# Patient Record
Sex: FEMALE | Race: WHITE | NOT HISPANIC OR LATINO | Employment: FULL TIME | URBAN - METROPOLITAN AREA
[De-identification: names, ages, dates, MRNs, and addresses within clinical notes are randomized per-mention and may not be internally consistent; named-entity substitution may affect disease eponyms.]

---

## 2018-04-10 ENCOUNTER — TRANSFERRED RECORDS (OUTPATIENT)
Dept: HEALTH INFORMATION MANAGEMENT | Facility: CLINIC | Age: 28
End: 2018-04-10

## 2018-04-17 ENCOUNTER — MEDICAL CORRESPONDENCE (OUTPATIENT)
Dept: HEALTH INFORMATION MANAGEMENT | Facility: CLINIC | Age: 28
End: 2018-04-17

## 2018-04-26 ENCOUNTER — OFFICE VISIT (OUTPATIENT)
Dept: SURGERY | Facility: CLINIC | Age: 28
End: 2018-04-26
Payer: COMMERCIAL

## 2018-04-26 VITALS
DIASTOLIC BLOOD PRESSURE: 91 MMHG | WEIGHT: 128 LBS | SYSTOLIC BLOOD PRESSURE: 136 MMHG | HEIGHT: 62 IN | HEART RATE: 93 BPM | BODY MASS INDEX: 23.55 KG/M2

## 2018-04-26 DIAGNOSIS — R10.31 RIGHT GROIN PAIN: Primary | ICD-10-CM

## 2018-04-26 PROCEDURE — 99244 OFF/OP CNSLTJ NEW/EST MOD 40: CPT | Performed by: SURGERY

## 2018-04-26 RX ORDER — MULTIPLE VITAMINS W/ MINERALS TAB 9MG-400MCG
1 TAB ORAL DAILY
COMMUNITY
End: 2024-03-05

## 2018-04-26 NOTE — LETTER
"[facility]  Surgery Consultation     2018    Re: Noemi Farias, : 1990    CONSULTATION REQUESTED BY:  RAAD Phillips     HPI: P this patient is a 27-year-old female referred by by the above-mentioned provider for consultation regarding possible right-sided sports hernia.  She reports that in January of this past year she was participating in a  conditioning course.  During the performance of this course she was participating in a 6 mile pack hike.  She experienced sharp pain within the area of her right hip as well as in the right lower quadrant and abdomen.  She experienced weakness associated with this and had to discontinue the activity.  Since then she has had ongoing discomfort ever since.  She has been treated with physical therapy as well as other modes of treatment.  She reports that the area aches all the time and gets tight with walking.  She feels that his perhaps gotten slightly worse.  She also describes shooting pains down her inner thigh with a long stride.  She has no pain on coughing or sneezing.  She had an MRI which revealed a right-sided labral tear.     PMH:   has no past medical history on file.  PSH:    has no past surgical history on file.  Social History:   reports that she has never smoked. She has never used smokeless tobacco.  Family History:  family history includes CEREBROVASCULAR DISEASE in her paternal grandmother; Colon Cancer in her maternal grandfather; Coronary Artery Disease in her paternal grandmother; DIABETES in her paternal grandmother; Hypertension in her father and paternal grandmother.  Medications/Allergies: Home medications and allergies reviewed.     ROS:  The 10 point Review of Systems is negative other than noted in the HPI.     Physical Exam:  BP (!) 136/91  Pulse 93  Ht 5' 2\" (1.575 m)  Wt 128 lb (58.1 kg)  BMI 23.41 kg/m2  GENERAL: Generally appears well.  Psych: Alert and Oriented.  Normal affect  Eyes: Sclera " clear  Respiratory:  Lungs clear to ausculation bilaterally with good air excursion  Cardiovascular:  Regular Rate and Rhythm with no murmurs gallops or rubs, normal peripheral pulses  GI: Abdomen Non Distended Non-Tender  No hernias palpated..  Groin- I examined the patient in both the standing and supine positions. Right Groin- No hernia Palpated.  Tenderness palpated in groin.  Tenderness within the right inguinal area was relatively minimal.  This was slightly worse with leg raise and sit up.  She had greater discomfort in the area of the medial groin/adductor tendon.  Left Groin- No hernia Palpated.  No tenderness on palpation. Lymphatic/Hematologic/Immune:  No femoral or cervical lymphadenopathy.  Integumentary:  No rashes  Neurological: grossly intact      All new lab and imaging data was reviewed.      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  Some minimal symptoms to suggest sports hernia.       PLAN: Given the lack of insurance coverage for sports hernia surgery would be my suggestion that she consider tendon lengthening surgery as a first line of treatment.  Thereafter I think she needs to have consideration regarding intervention on the hip itself.  I discussed the pathophysiology of sports hernias and options for repair including laparoscopic VS open.  Her questions were all answered.  She is to meet with her local orthopedist next week and hopefully make a decision regarding which direction to proceed.    Thank you very much for this consult.     Viraj Cervantes M.D.  [facility]  [phone]

## 2018-04-26 NOTE — MR AVS SNAPSHOT
"              After Visit Summary   2018    Noemi Farias    MRN: [id number]           Patient Information     Date Of Birth          1990        Visit Information        Provider Department      2018 11:45 AM Viraj Cervantes [facility] General [facility]       Follow-ups after your visit        Who to contact     If you have questions or need follow up information about today's clinic visit or your schedule please contact [facility] directly at [phone].  Normal or non-critical lab and imaging results will be communicated to you by Seeqhart, letter or phone within 4 business days after the clinic has received the results. If you do not hear from us within 7 days, please contact the clinic through Seeqhart or phone. If you have a critical or abnormal lab result, we will notify you by phone as soon as possible.  Submit refill requests through Model Metrics or call your pharmacy and they will forward the refill request to us. Please allow 3 business days for your refill to be completed.          Additional Information About Your Visit        MyChart Information     Model Metrics lets you send messages to your doctor, view your test results, renew your prescriptions, schedule appointments and more. To sign up, go to [email or web address] . Click on \"Log in\" on the left side of the screen, which will take you to the Welcome page. Then click on \"Sign up Now\" on the right side of the page.     You will be asked to enter the access code listed below, as well as some personal information. Please follow the directions to create your username and password.     Your access code is: [email or web address]  Expires: 2018 12:09 PM     Your access code will  in 90 days. If you need help or a new code, please call your [place] clinic or [phone].        Care EveryWhere ID     This is your Care EveryWhere ID. This could be used by other " "organizations to access your [place] medical records  [id number]        Your Vitals Were     Pulse Height BMI (Body Mass Index)             93 5' 2\" (1.575 m) 23.41 kg/m2          Blood Pressure from Last 3 Encounters:   04/26/18 (!) 136/91    Weight from Last 3 Encounters:   04/26/18 128 lb (58.1 kg)              Today, you had the following     No orders found for display       Primary Care Provider Fax #    Physician No Ref-Primary [phone]       No address on file        Equal Access to Services     VIDA DUMONT : Hadii aad ku hadasho Soomaali, waaxda luqadaha, qaybta kaalmada adeegyada, yina ryderin haygonzalon sree randolph . So [facility] [phone].    ATENCIÓN: Si habla español, tiene a carrington disposición servicios gratuitos de asistencia lingüística. Llame al [phone].    We comply with applicable federal civil rights laws and [place] laws. We do not discriminate on the basis of race, color, national origin, age, disability, sex, sexual orientation, or gender identity.            Thank you!     Thank you for choosing [facility]  for your care. Our goal is always to provide you with excellent care. Hearing back from our patients is one way we can continue to improve our services. Please take a few minutes to complete the written survey that you may receive in the mail after your visit with us. Thank you!             Your Updated Medication List - Protect others around you: Learn how to safely use, store and throw away your medicines at www.disposemymeds.org.          This list is accurate as of 4/26/18 12:09 PM.  Always use your most recent med list.                   Brand Name Dispense Instructions for use Diagnosis    IBUPROFEN PO           Multi-vitamin Tabs tablet      Take 1 tablet by mouth daily        TART CHERRY ADVANCED PO           TYLENOL 325 MG Caps   Generic drug:  Acetaminophen           VITAMIN D (CHOLECALCIFEROL) PO      Take by mouth daily          "

## 2018-04-26 NOTE — PROGRESS NOTES
"[facility]  Surgery Consultation    CONSULTATION REQUESTED BY:  RAAD Phillips    HPI: P this patient is a 27-year-old female referred by by the above-mentioned provider for consultation regarding possible right-sided sports hernia.  She reports that in January of this past year she was participating in a  conditioning course.  During the performance of this course she was participating in a 6 mile pack hike.  She experienced sharp pain within the area of her right hip as well as in the right lower quadrant and abdomen.  She experienced weakness associated with this and had to discontinue the activity.  Since then she has had ongoing discomfort ever since.  She has been treated with physical therapy as well as other modes of treatment.  She reports that the area aches all the time and gets tight with walking.  She feels that his perhaps gotten slightly worse.  She also describes shooting pains down her inner thigh with a long stride.  She has no pain on coughing or sneezing.  She had an MRI which revealed a right-sided labral tear.    PMH:   has no past medical history on file.  PSH:    has no past surgical history on file.  Social History:   reports that she has never smoked. She has never used smokeless tobacco.  Family History:  family history includes CEREBROVASCULAR DISEASE in her paternal grandmother; Colon Cancer in her maternal grandfather; Coronary Artery Disease in her paternal grandmother; DIABETES in her paternal grandmother; Hypertension in her father and paternal grandmother.  Medications/Allergies: Home medications and allergies reviewed.    ROS:  The 10 point Review of Systems is negative other than noted in the HPI.    Physical Exam:  BP (!) 136/91  Pulse 93  Ht 5' 2\" (1.575 m)  Wt 128 lb (58.1 kg)  BMI 23.41 kg/m2  GENERAL: Generally appears well.  Psych: Alert and Oriented.  Normal affect  Eyes: Sclera clear  Respiratory:  Lungs clear to ausculation bilaterally with " good air excursion  Cardiovascular:  Regular Rate and Rhythm with no murmurs gallops or rubs, normal peripheral pulses  GI: Abdomen Non Distended Non-Tender  No hernias palpated..  Groin- I examined the patient in both the standing and supine positions. Right Groin- No hernia Palpated.  Tenderness palpated in groin.  Tenderness within the right inguinal area was relatively minimal.  This was slightly worse with leg raise and sit up.  She had greater discomfort in the area of the medial groin/adductor tendon.  Left Groin- No hernia Palpated.  No tenderness on palpation. Lymphatic/Hematologic/Immune:  No femoral or cervical lymphadenopathy.  Integumentary:  No rashes  Neurological: grossly intact     All new lab and imaging data was reviewed.     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  Some minimal symptoms to suggest sports hernia.      PLAN: Given the lack of insurance coverage for sports hernia surgery would be my suggestion that she consider tendon lengthening surgery as a first line of treatment.  Thereafter I think she needs to have consideration regarding intervention on the hip itself.  I discussed the pathophysiology of sports hernias and options for repair including laparoscopic VS open.  Her questions were all answered.  She is to meet with her local orthopedist next week and hopefully make a decision regarding which direction to proceed.      Thank you very much for this consult.    Viraj Cervantes M.D.  [facility]  [phone]    Please route or send letter to:  Primary Care Provider (PCP) and Referring Provider  Cc: Vitor Christianson, [facility] and Rosendo Ibrahim MD

## 2023-10-06 ENCOUNTER — TELEPHONE (OUTPATIENT)
Dept: PLASTIC SURGERY | Facility: CLINIC | Age: 33
End: 2023-10-06
Payer: COMMERCIAL

## 2023-10-06 DIAGNOSIS — F64.0 GENDER DYSPHORIA IN ADULT: Primary | ICD-10-CM

## 2023-10-06 NOTE — TELEPHONE ENCOUNTER
[facility] Call Center    Phone Message    May a detailed message be left on voicemail: yes     Reason for Call: Other: Patient called to schedule a consult for Top Surgery.  F to M and preferred pronouns are They/Them.  Please follow up with patient.     Action Taken: Message routed to:  [facility]    Travel Screening: Not Applicable

## 2023-10-06 NOTE — CONFIDENTIAL NOTE
[facility] :  Care Coordination Note     SITUATION   MEGAN Farias (they/them) is a 32 year old adult who is receiving support for:  Appointment (Top Surgery consult) and Care Team  .    BACKGROUND     Pt is scheduled for a gender affirming mastectomy consult with Dr. Pa on 3/7/24.     ASSESSMENT     Surgery              [facility] Assessment  [facility] Gender Care ([facility] Enrollment: (P) Enrolled  Patient has a therapist: (P) Yes  Letter of support #1: (P) Requested  Surgery being considered: (P) Yes  Mastectomy: (P) Yes    Pt reports:   No nicotine or other gender affirming surgeries    PLAN          Nursing Interventions:       Follow-up plan:  1. Obtain LOS  2. Pt reports their insurance will require referral from PCP. Writer encouraged pt to have provider send this prior to consult.        Ely Hanna

## 2023-10-23 ENCOUNTER — TRANSCRIBE ORDERS (OUTPATIENT)
Dept: OTHER | Age: 33
End: 2023-10-23

## 2023-10-23 DIAGNOSIS — F64.9 GENDER DYSPHORIA: Primary | ICD-10-CM

## 2024-01-05 ENCOUNTER — TELEPHONE (OUTPATIENT)
Dept: PLASTIC SURGERY | Facility: CLINIC | Age: 34
End: 2024-01-05
Payer: COMMERCIAL

## 2024-01-05 NOTE — TELEPHONE ENCOUNTER
[facility] Call Center    Phone Message    May a detailed message be left on voicemail: yes     Reason for Call: Other: Patient called as they received a message to call back and reschedule the 3/7/24 appt with Dr. Pa ([facility]).  Please follow up with patient.     Action Taken: Message routed to:  [facility]    Travel Screening: Not Applicable

## 2024-01-05 NOTE — TELEPHONE ENCOUNTER
Return call placed, patient has been rescheduled.     Krystina Rodriguez RN on 1/5/2024 at 3:18 PM

## 2024-02-25 ENCOUNTER — HEALTH MAINTENANCE LETTER (OUTPATIENT)
Age: 34
End: 2024-02-25

## 2024-03-05 ENCOUNTER — OFFICE VISIT (OUTPATIENT)
Dept: PLASTIC SURGERY | Facility: AMBULATORY SURGERY CENTER | Age: 34
End: 2024-03-05
Attending: PLASTIC SURGERY
Payer: COMMERCIAL

## 2024-03-05 VITALS
BODY MASS INDEX: 27.6 KG/M2 | HEIGHT: 62 IN | WEIGHT: 150 LBS | DIASTOLIC BLOOD PRESSURE: 78 MMHG | SYSTOLIC BLOOD PRESSURE: 124 MMHG

## 2024-03-05 DIAGNOSIS — F64.0 GENDER DYSPHORIA IN ADULT: Primary | ICD-10-CM

## 2024-03-05 PROCEDURE — 99203 OFFICE O/P NEW LOW 30 MIN: CPT | Performed by: PLASTIC SURGERY

## 2024-03-05 RX ORDER — TRETINOIN 1 MG/G
CREAM TOPICAL AT BEDTIME
COMMUNITY
Start: 2023-03-01

## 2024-03-05 RX ORDER — TESTOSTERONE CYPIONATE 200 MG/ML
INJECTION, SOLUTION INTRAMUSCULAR WEEKLY
COMMUNITY
Start: 2023-06-07

## 2024-03-05 RX ORDER — HYDROXYZINE PAMOATE 50 MG/1
50 CAPSULE ORAL 3 TIMES DAILY PRN
COMMUNITY
Start: 2024-02-01

## 2024-03-05 RX ORDER — TRAZODONE HYDROCHLORIDE 100 MG/1
100 TABLET ORAL AT BEDTIME
COMMUNITY
Start: 2022-09-01

## 2024-03-05 RX ORDER — DULOXETIN HYDROCHLORIDE 60 MG/1
60 CAPSULE, DELAYED RELEASE ORAL DAILY
COMMUNITY
Start: 2022-09-01

## 2024-03-05 NOTE — PROGRESS NOTES
March 5, 2024    Chief complaint:  Gender dysphoria, consultation for top surgery     History of present illness:  This is a 33 year old patient who comes today for consultation regarding top surgery.  Pronouns they/them.  Patient's name is Jorge.  Patient has been taking testosterone for the last 1 year and a half.  They have been dealing with gender dysphoria for the last 20 years.  Letter of support is ready.     Past medical history:  History reviewed. No pertinent past medical history.    Gender dysphoria     Past surgical history:  Right hip arthroplasty, right knee arthroscopy     Allergies:  No known drug allergies    Medications:    Current Outpatient Medications:     Acetaminophen (TYLENOL) 325 MG CAPS, , Disp: , Rfl:     DULoxetine (CYMBALTA) 60 MG capsule, Take 60 mg by mouth daily, Disp: , Rfl:     hydrOXYzine (VISTARIL) 50 MG capsule, Take 50 mg by mouth 3 times daily as needed for anxiety, Disp: , Rfl:     IBUPROFEN PO, , Disp: , Rfl:     testosterone cypionate (DEPOTESTOSTERONE) 200 MG/ML injection, Inject into the muscle once a week, Disp: , Rfl:     traZODone (DESYREL) 100 MG tablet, Take 100 mg by mouth at bedtime, Disp: , Rfl:     tretinoin (RETIN-A) 0.1 % external cream, Apply topically at bedtime, Disp: , Rfl:     Family history:  Father with renal cell carcinoma.     Social History:  Denies tobacco, drinks alcohol socially.     Review of systems:  General ROS: No complaints or constitutional symptoms  Skin: No complaints or symptoms   Hematologic/Lymphatic: No symptoms or complaints  Psychiatric: Patient presents with gender dysphoria  Endocrine: No excessive fatigue, no hypermetabolic symptoms reported  Respiratory ROS: No cough, shortness of breath, or wheezing  Cardiovascular ROS: No chest pain or dyspnea on exertion  Breast ROS: Denies nipple discharge, denies palpable breast masses, denies peau d'orange.  Gastrointestinal ROS: No abdominal pain, nausea, diarrhea, or  "constipation  Musculoskeletal ROS: No recent injuries reported  Neurological ROS: No focal neurologic defects reported.       Physical exam:    /78   Ht 1.575 m (5' 2\")   Wt 68 kg (150 lb)   BMI 27.44 kg/m    General: Alert, cooperative, appears stated age   Skin: Skin color, texture, turgor normal, no rashes or lesions   Lymphatic: No obvious adenopathy, no swelling   Eyes: No scleral icterus, pupils equal  HENT: No traumatic injury to the head or face, no gross abnormalities  Lungs: Normal respiratory effort, breath sounds equal bilaterally  Heart: Regular rate and rhythm  Breasts: Bilateral grade 2 ptosis, no obvious nipple inversion, no obvious nipple discharge, no obvious palpable breast masses, no peau d'orange.  No palpable axillary lymphadenopathies bilaterally.  Abdomen: Soft, non-distended and non-tender to palpation  Neurologic: Grossly intact           Assessment:     33 year old  patient with history of gender dysphoria.     PLAN:      I believe that Jorge is a good candidate for gender affirming top surgery with one of the following options: Bilateral simple mastectomies versus bilateral breast reduction with possible nipple graft reconstruction.  Patient desires POSSIBLE nipple reconstruction with nipple grafting bilaterally.     \"According to [place] case law and [facility] standards of care, with an appropriate letter of support from a mental health provider, top surgery/mastectomy is medically necessary for the treatment of gender dysphoria.\"     I discussed with Jorge the risks of surgery and they include but are not limited to scarring, keloid formation, infection, bleeding, hematoma, seroma, contour deformities, lack of sensation on the chest, loss of nipple areolar graft requiring future tattooing, hypopigmentation of the nipple grafting with potential need for future tattooing.     Patient did acknowledge all of these risks and has agreed to proceed.     We will schedule for surgery: " bilateral simple mastectomies with POSSIBLE bilateral nipple grafting.     Time spent with the patient 30 minutes.      Sonny Pa MD , FACS   Diplomate American Board of Plastic Surgery  Diplomate American Board of Surgery  Adj. Assistant Professor of Surgery  Division of Plastic & Reconstructive Surgery   [facility]  Office: [phone]   3/5/2024 at 9:17 AM

## 2024-03-05 NOTE — LETTER
3/5/2024         RE: Noemi Farias  [address] [address]        Dear Colleague,    Thank you for referring your patient, Noemi Farias, to the [facility] [place]. Please see a copy of my visit note below.    March 5, 2024    Chief complaint:  Gender dysphoria, consultation for top surgery     History of present illness:  This is a 33 year old patient who comes today for consultation regarding top surgery.  Pronouns they/them.  Patient's name is Jorge.  Patient has been taking testosterone for the last 1 year and a half.  They have been dealing with gender dysphoria for the last 20 years.  Letter of support is ready.     Past medical history:  History reviewed. No pertinent past medical history.    Gender dysphoria     Past surgical history:  Right hip arthroplasty, right knee arthroscopy     Allergies:  No known drug allergies    Medications:    Current Outpatient Medications:      Acetaminophen (TYLENOL) 325 MG CAPS, , Disp: , Rfl:      DULoxetine (CYMBALTA) 60 MG capsule, Take 60 mg by mouth daily, Disp: , Rfl:      hydrOXYzine (VISTARIL) 50 MG capsule, Take 50 mg by mouth 3 times daily as needed for anxiety, Disp: , Rfl:      IBUPROFEN PO, , Disp: , Rfl:      testosterone cypionate (DEPOTESTOSTERONE) 200 MG/ML injection, Inject into the muscle once a week, Disp: , Rfl:      traZODone (DESYREL) 100 MG tablet, Take 100 mg by mouth at bedtime, Disp: , Rfl:      tretinoin (RETIN-A) 0.1 % external cream, Apply topically at bedtime, Disp: , Rfl:     Family history:  Father with renal cell carcinoma.     Social History:  Denies tobacco, drinks alcohol socially.     Review of systems:  General ROS: No complaints or constitutional symptoms  Skin: No complaints or symptoms   Hematologic/Lymphatic: No symptoms or complaints  Psychiatric: Patient presents with gender dysphoria  Endocrine: No excessive fatigue, no hypermetabolic symptoms reported  Respiratory ROS: No cough,  "shortness of breath, or wheezing  Cardiovascular ROS: No chest pain or dyspnea on exertion  Breast ROS: Denies nipple discharge, denies palpable breast masses, denies peau d'orange.  Gastrointestinal ROS: No abdominal pain, nausea, diarrhea, or constipation  Musculoskeletal ROS: No recent injuries reported  Neurological ROS: No focal neurologic defects reported.       Physical exam:    /78   Ht 1.575 m (5' 2\")   Wt 68 kg (150 lb)   BMI 27.44 kg/m    General: Alert, cooperative, appears stated age   Skin: Skin color, texture, turgor normal, no rashes or lesions   Lymphatic: No obvious adenopathy, no swelling   Eyes: No scleral icterus, pupils equal  HENT: No traumatic injury to the head or face, no gross abnormalities  Lungs: Normal respiratory effort, breath sounds equal bilaterally  Heart: Regular rate and rhythm  Breasts: Bilateral grade 2 ptosis, no obvious nipple inversion, no obvious nipple discharge, no obvious palpable breast masses, no peau d'orange.  No palpable axillary lymphadenopathies bilaterally.  Abdomen: Soft, non-distended and non-tender to palpation  Neurologic: Grossly intact           Assessment:     33 year old  patient with history of gender dysphoria.     PLAN:      I believe that Jorge is a good candidate for gender affirming top surgery with one of the following options: Bilateral simple mastectomies versus bilateral breast reduction with possible nipple graft reconstruction.  Patient desires POSSIBLE nipple reconstruction with nipple grafting bilaterally.     \"According to [place] case law and [facility] standards of care, with an appropriate letter of support from a mental health provider, top surgery/mastectomy is medically necessary for the treatment of gender dysphoria.\"     I discussed with Jorge the risks of surgery and they include but are not limited to scarring, keloid formation, infection, bleeding, hematoma, seroma, contour deformities, lack of sensation on the chest, loss of " nipple areolar graft requiring future tattooing, hypopigmentation of the nipple grafting with potential need for future tattooing.     Patient did acknowledge all of these risks and has agreed to proceed.     We will schedule for surgery: bilateral simple mastectomies with POSSIBLE bilateral nipple grafting.     Time spent with the patient 30 minutes.      Sonny Pa MD , FACS   Diplomate American Board of Plastic Surgery  Diplomate American Board of Surgery  Adj. Assistant Professor of Surgery  Division of Plastic & Reconstructive Surgery   [facility]  Office: [phone]   3/5/2024 at 9:17 AM       Again, thank you for allowing me to participate in the care of your patient.        Sincerely,        Sonny Pa MD

## 2024-03-20 ENCOUNTER — DOCUMENTATION ONLY (OUTPATIENT)
Dept: PLASTIC SURGERY | Facility: CLINIC | Age: 34
End: 2024-03-20
Payer: COMMERCIAL

## 2024-03-20 NOTE — PROGRESS NOTES
[facility] [place] [facility] :  Care Coordination Note     SITUATION   Charan Farias (they/them) is a 33 year old adult who is receiving support for:  Care Team  .    BACKGROUND     [place] needs [employer] language. Sent message to pt with needed update.    3/27/2024  Updated LOS meets [employer] criteria. Sent message to RNCC and PA with update.    ASSESSMENT     Surgery              [facility] Assessment  Comprehensive Gender Care ([facility]) Enrollment: Enrolled  Patient has a therapist: Yes  Letter of support #1: Received  Letter #1 Date: 02/28/24  Surgery being considered: Yes  Mastectomy: Yes      PLAN          Nursing Interventions:       Follow-up plan:  Pt will upload updated LOS.        TERRY Kwong

## 2024-03-21 ENCOUNTER — TELEPHONE (OUTPATIENT)
Dept: PLASTIC SURGERY | Facility: CLINIC | Age: 34
End: 2024-03-21
Payer: COMMERCIAL

## 2024-04-03 ENCOUNTER — TELEPHONE (OUTPATIENT)
Dept: PLASTIC SURGERY | Facility: AMBULATORY SURGERY CENTER | Age: 34
End: 2024-04-03
Payer: COMMERCIAL

## 2024-04-03 PROBLEM — F64.0 GENDER DYSPHORIA IN ADULT: Status: ACTIVE | Noted: 2024-03-05

## 2024-04-03 NOTE — LETTER
Pre-op Physical:  Schedule a pre-op physical with your primary care doctor if not internal to [facility].  If internal, we have scheduled this.   The pre-op physical must be 10-30 days before surgery and since it is required by anesthesia, your surgery will be cancelled if it's not done.      Surgery Date: 5/15/2024     Location: [facility].    [address], [address], [place]  [address]    Approximate Arrival Time: 5:45 am  (Unless instructed differently by the pre-op call nurse)     Post op Appointment: 5/22/2024 at 10:00 am with  Krystina  [facility],   [address], [place] [address].    Post op Appointment: 6/17/2024 at 10:30 am with  Tiffany Murray NP  [facility].    [address], [address], [place]  [address]    Pre-Surgical Tasks:     Review all medications with your primary care or prescribing physician; they will advise you which meds to stop and when, and when you can resume taking.  Certain medications like blood thinners and weight loss medications need to be stopped in advance of surgery to proceed safely.      Blood thinners including but not exclusive to drugs like Xarelto, Eliquis, Warfarin and Aspirin, should be stopped five days before surgery, if your prescribing provider agrees. Follow your provider's advice on stopping blood thinners because they know you best.  If you are unsure if your medication is a blood thinner, ask your prescribing provider.    Weight loss medications: There are multiple medications being used for weight management and diabetes today, and the list is growing.  Phentermine, Ozempic, Wegovy, Trulicity, and other similar medications need to be stopped one week before surgery to avoid being cancelled.  Victoza and Saxenda can be continued longer but must be stopped one full day before surgery.   Please ask your prescribing provider for advice.    Diabetic medications: in addition to the medications talked about above that are used for either weight loss or diabetes, some people are on insulin that may require adjustment.  Please discuss managing diabetic medications with your prescribing doctor as these medications may require modification prior to surgery.     Please shower the evening before and morning of surgery with Hibiclens soap.  Any [facility] can provide this to you at no cost, or it can be found at your local pharmacy.     Fasting instructions will be provided by the pre-op nurse who will call you 1-3 days before surgery.  Typically, we advise normal food up to 8 hours before you arrive for surgery. Clear liquids only from then until 2 hours before you arrive surgery, then nothing at all by mouth.  The nurse will review your specific instructions with you at the call.      Smoking impacts your body's ability to heal properly so we advise patients to quit if possible before surgery.  Plastic Surgery patients are required to be nicotine free for at least 8 weeks before surgery.      You will need an adult to drive you home and stay with you 24 hours after surgery. Public transportation or Medical Van Services are not permitted.    Visitor restrictions are subject to change, please verify with the pre-op nurse when they call how many people are permitted to accompany you.    We always encourage you to notify your insurance any time you have medical tests or procedures scheduled including surgery. The number is usually right on the back of your insurance card. To obtain pricing for surgery, please call [facility] Cost of Care at [phone] or email [email or web address].        Call our office if you have any questions! Thank you!         Bhumi Alcantara MA  [facility]   (General Surgery/ ENT/ Plastics)  Direct Office: [phone]

## 2024-04-03 NOTE — TELEPHONE ENCOUNTER
Left a message to schedule surgery with Dr. Pa per case request.  Sent Daily Deals for [employer] message also.

## 2024-04-03 NOTE — TELEPHONE ENCOUNTER
Spoke with patient today regarding surgery scheduling      Went over details/instructions. Patient will [facility].    Surgery Letter sent via [employer]  (Please see LETTERS TAB in chart to retrieve a copy of this letter)

## 2024-04-25 ENCOUNTER — TRANSFERRED RECORDS (OUTPATIENT)
Dept: HEALTH INFORMATION MANAGEMENT | Facility: CLINIC | Age: 34
End: 2024-04-25
Payer: COMMERCIAL

## 2024-05-14 ENCOUNTER — ANESTHESIA EVENT (OUTPATIENT)
Dept: SURGERY | Facility: AMBULATORY SURGERY CENTER | Age: 34
End: 2024-05-14
Payer: COMMERCIAL

## 2024-05-14 NOTE — ANESTHESIA PREPROCEDURE EVALUATION
"Anesthesia Pre-Procedure Evaluation    Patient: Noemi Farias   MRN: [id number] : 1990        Procedure : Procedure(s):  BILATERAL MASTECTOMY, SIMPLE WITH POSSIBLE BILATERAL FREE NIPPLE GRAFTING.          No past medical history on file.   Past Surgical History:   Procedure Laterality Date     ARTHROSCOPY HIP Right 2018     ARTHROSCOPY KNEE Right 2014     WISDOM TOOTH EXTRACTION  2007      Allergies   Allergen Reactions     Kiwi Nausea and Vomiting and Itching      Social History     Tobacco Use     Smoking status: Never     Smokeless tobacco: Never   Substance Use Topics     Alcohol use: Yes     Comment: 2 drinks 3x per week      Wt Readings from Last 1 Encounters:   24 68 kg (150 lb)        Anesthesia Evaluation   Pt has had prior anesthetic. Type: General.        ROS/MED HX  ENT/Pulmonary:  - neg pulmonary ROS     Neurologic:  - neg neurologic ROS     Cardiovascular:  - neg cardiovascular ROS     METS/Exercise Tolerance: >4 METS    Hematologic:  - neg hematologic  ROS     Musculoskeletal:  - neg musculoskeletal ROS     GI/Hepatic:  - neg GI/hepatic ROS     Renal/Genitourinary:  - neg Renal ROS     Endo:  - neg endo ROS     Psychiatric/Substance Use: Comment: Gender dysphoria    (+) psychiatric history depression       Infectious Disease:  - neg infectious disease ROS     Malignancy:  - neg malignancy ROS     Other:  - neg other ROS          Physical Exam    Airway        Mallampati: II   TM distance: > 3 FB   Neck ROM: full   Mouth opening: > 3 cm    Respiratory Devices and Support         Dental       (+) Minor Abnormalities - some fillings, tiny chips      Cardiovascular   cardiovascular exam normal       Rhythm and rate: regular and normal     Pulmonary   pulmonary exam normal        breath sounds clear to auscultation       OUTSIDE LABS:  CBC: No results found for: \"WBC\", \"HGB\", \"HCT\", \"PLT\"  BMP: No results found for: \"NA\", \"POTASSIUM\", \"CHLORIDE\", \"CO2\", \"BUN\", \"CR\", \"GLC\"  COAGS: No " "results found for: \"PTT\", \"INR\", \"FIBR\"  POC: No results found for: \"BGM\", \"HCG\", \"HCGS\"  HEPATIC: No results found for: \"ALBUMIN\", \"PROTTOTAL\", \"ALT\", \"AST\", \"GGT\", \"ALKPHOS\", \"BILITOTAL\", \"BILIDIRECT\", \"CATHY\"  OTHER: No results found for: \"PH\", \"LACT\", \"A1C\", \"KYLE\", \"PHOS\", \"MAG\", \"LIPASE\", \"AMYLASE\", \"TSH\", \"T4\", \"T3\", \"CRP\", \"SED\"    Anesthesia Plan    ASA Status:  1    NPO Status:  NPO Appropriate    Anesthesia Type: General.     - Airway: LMA              Consents    Anesthesia Plan(s) and associated risks, benefits, and realistic alternatives discussed. Questions answered and patient/representative(s) expressed understanding.     - Discussed:     - Discussed with:  Patient      - Extended Intubation/Ventilatory Support Discussed: No.      - Patient is DNR/DNI Status: No     Use of blood products discussed: No .     Postoperative Care    Pain management: Multi-modal analgesia.   PONV prophylaxis: Ondansetron (or other 5HT-3), Dexamethasone or Solumedrol, Background Propofol Infusion, Scopolamine patch     Comments:               Danitza Hickey MD    I have reviewed the pertinent notes and labs in the chart from the past 30 days and (re)examined the patient.  Any updates or changes from those notes are reflected in this note.                  "

## 2024-05-15 ENCOUNTER — ANESTHESIA (OUTPATIENT)
Dept: SURGERY | Facility: AMBULATORY SURGERY CENTER | Age: 34
End: 2024-05-15
Payer: COMMERCIAL

## 2024-05-15 ENCOUNTER — HOSPITAL ENCOUNTER (OUTPATIENT)
Facility: AMBULATORY SURGERY CENTER | Age: 34
Discharge: HOME | End: 2024-05-15
Attending: PLASTIC SURGERY | Admitting: PLASTIC SURGERY
Payer: COMMERCIAL

## 2024-05-15 VITALS
HEART RATE: 90 BPM | BODY MASS INDEX: 27.6 KG/M2 | RESPIRATION RATE: 16 BRPM | WEIGHT: 150 LBS | HEIGHT: 62 IN | DIASTOLIC BLOOD PRESSURE: 65 MMHG | TEMPERATURE: 97.4 F | OXYGEN SATURATION: 92 % | SYSTOLIC BLOOD PRESSURE: 105 MMHG

## 2024-05-15 DIAGNOSIS — Z90.13 S/P BILATERAL MASTECTOMY: Primary | ICD-10-CM

## 2024-05-15 PROCEDURE — 88305 TISSUE EXAM BY PATHOLOGIST: CPT | Mod: 26 | Performed by: PATHOLOGY

## 2024-05-15 PROCEDURE — 19303 MAST SIMPLE COMPLETE: CPT | Performed by: ANESTHESIOLOGY

## 2024-05-15 PROCEDURE — 19303 MAST SIMPLE COMPLETE: CPT | Mod: 50 | Performed by: PLASTIC SURGERY

## 2024-05-15 PROCEDURE — 19303 MAST SIMPLE COMPLETE: CPT | Mod: 50

## 2024-05-15 PROCEDURE — 19303 MAST SIMPLE COMPLETE: CPT | Performed by: NURSE ANESTHETIST, CERTIFIED REGISTERED

## 2024-05-15 PROCEDURE — 88305 TISSUE EXAM BY PATHOLOGIST: CPT | Mod: TC | Performed by: PLASTIC SURGERY

## 2024-05-15 RX ORDER — LIDOCAINE HYDROCHLORIDE 20 MG/ML
INJECTION, SOLUTION INFILTRATION; PERINEURAL PRN
Status: DISCONTINUED | OUTPATIENT
Start: 2024-05-15 | End: 2024-05-15

## 2024-05-15 RX ORDER — SODIUM CHLORIDE, SODIUM LACTATE, POTASSIUM CHLORIDE, CALCIUM CHLORIDE 600; 310; 30; 20 MG/100ML; MG/100ML; MG/100ML; MG/100ML
INJECTION, SOLUTION INTRAVENOUS CONTINUOUS
Status: DISCONTINUED | OUTPATIENT
Start: 2024-05-15 | End: 2024-05-15 | Stop reason: HOSPADM

## 2024-05-15 RX ORDER — DEXAMETHASONE SODIUM PHOSPHATE 10 MG/ML
4 INJECTION, SOLUTION INTRAMUSCULAR; INTRAVENOUS
Status: DISCONTINUED | OUTPATIENT
Start: 2024-05-15 | End: 2024-05-15 | Stop reason: HOSPADM

## 2024-05-15 RX ORDER — ONDANSETRON 4 MG/1
4 TABLET, ORALLY DISINTEGRATING ORAL EVERY 30 MIN PRN
Status: DISCONTINUED | OUTPATIENT
Start: 2024-05-15 | End: 2024-05-15 | Stop reason: HOSPADM

## 2024-05-15 RX ORDER — GLYCOPYRROLATE 0.2 MG/ML
INJECTION, SOLUTION INTRAMUSCULAR; INTRAVENOUS PRN
Status: DISCONTINUED | OUTPATIENT
Start: 2024-05-15 | End: 2024-05-15

## 2024-05-15 RX ORDER — HYDROMORPHONE HYDROCHLORIDE 1 MG/ML
0.2 INJECTION, SOLUTION INTRAMUSCULAR; INTRAVENOUS; SUBCUTANEOUS EVERY 5 MIN PRN
Status: DISCONTINUED | OUTPATIENT
Start: 2024-05-15 | End: 2024-05-15 | Stop reason: HOSPADM

## 2024-05-15 RX ORDER — ONDANSETRON 2 MG/ML
INJECTION INTRAMUSCULAR; INTRAVENOUS PRN
Status: DISCONTINUED | OUTPATIENT
Start: 2024-05-15 | End: 2024-05-15

## 2024-05-15 RX ORDER — ONDANSETRON 4 MG/1
4 TABLET, ORALLY DISINTEGRATING ORAL EVERY 30 MIN PRN
Status: DISCONTINUED | OUTPATIENT
Start: 2024-05-15 | End: 2024-05-16 | Stop reason: HOSPADM

## 2024-05-15 RX ORDER — ACETAMINOPHEN 325 MG/1
975 TABLET ORAL EVERY 4 HOURS PRN
Status: DISCONTINUED | OUTPATIENT
Start: 2024-05-15 | End: 2024-05-15 | Stop reason: HOSPADM

## 2024-05-15 RX ORDER — ONDANSETRON 4 MG/1
4 TABLET, ORALLY DISINTEGRATING ORAL EVERY 6 HOURS PRN
Qty: 16 TABLET | Refills: 0 | Status: SHIPPED | OUTPATIENT
Start: 2024-05-15 | End: 2024-06-21

## 2024-05-15 RX ORDER — HYDROMORPHONE HYDROCHLORIDE 1 MG/ML
0.4 INJECTION, SOLUTION INTRAMUSCULAR; INTRAVENOUS; SUBCUTANEOUS EVERY 5 MIN PRN
Status: DISCONTINUED | OUTPATIENT
Start: 2024-05-15 | End: 2024-05-15 | Stop reason: HOSPADM

## 2024-05-15 RX ORDER — CEPHALEXIN 500 MG/1
500 CAPSULE ORAL 3 TIMES DAILY
Qty: 9 CAPSULE | Refills: 0 | Status: SHIPPED | OUTPATIENT
Start: 2024-05-15 | End: 2024-05-18

## 2024-05-15 RX ORDER — ONDANSETRON 2 MG/ML
4 INJECTION INTRAMUSCULAR; INTRAVENOUS EVERY 30 MIN PRN
Status: DISCONTINUED | OUTPATIENT
Start: 2024-05-15 | End: 2024-05-15 | Stop reason: HOSPADM

## 2024-05-15 RX ORDER — FENTANYL CITRATE 50 UG/ML
50 INJECTION, SOLUTION INTRAMUSCULAR; INTRAVENOUS EVERY 5 MIN PRN
Status: DISCONTINUED | OUTPATIENT
Start: 2024-05-15 | End: 2024-05-15 | Stop reason: HOSPADM

## 2024-05-15 RX ORDER — SCOLOPAMINE TRANSDERMAL SYSTEM 1 MG/1
1 PATCH, EXTENDED RELEASE TRANSDERMAL ONCE
Status: DISCONTINUED | OUTPATIENT
Start: 2024-05-15 | End: 2024-05-16 | Stop reason: HOSPADM

## 2024-05-15 RX ORDER — ONDANSETRON 2 MG/ML
4 INJECTION INTRAMUSCULAR; INTRAVENOUS EVERY 30 MIN PRN
Status: DISCONTINUED | OUTPATIENT
Start: 2024-05-15 | End: 2024-05-16 | Stop reason: HOSPADM

## 2024-05-15 RX ORDER — FENTANYL CITRATE 50 UG/ML
INJECTION, SOLUTION INTRAMUSCULAR; INTRAVENOUS PRN
Status: DISCONTINUED | OUTPATIENT
Start: 2024-05-15 | End: 2024-05-15

## 2024-05-15 RX ORDER — DEXAMETHASONE SODIUM PHOSPHATE 10 MG/ML
4 INJECTION, SOLUTION INTRAMUSCULAR; INTRAVENOUS
Status: DISCONTINUED | OUTPATIENT
Start: 2024-05-15 | End: 2024-05-16 | Stop reason: HOSPADM

## 2024-05-15 RX ORDER — CEFAZOLIN SODIUM 2 G/50ML
2 SOLUTION INTRAVENOUS
Status: COMPLETED | OUTPATIENT
Start: 2024-05-15 | End: 2024-05-15

## 2024-05-15 RX ORDER — PROPOFOL 10 MG/ML
INJECTION, EMULSION INTRAVENOUS PRN
Status: DISCONTINUED | OUTPATIENT
Start: 2024-05-15 | End: 2024-05-15

## 2024-05-15 RX ORDER — LIDOCAINE 40 MG/G
CREAM TOPICAL
Status: DISCONTINUED | OUTPATIENT
Start: 2024-05-15 | End: 2024-05-15 | Stop reason: HOSPADM

## 2024-05-15 RX ORDER — OXYCODONE HYDROCHLORIDE 5 MG/1
10 TABLET ORAL
Status: DISCONTINUED | OUTPATIENT
Start: 2024-05-15 | End: 2024-05-16 | Stop reason: HOSPADM

## 2024-05-15 RX ORDER — FENTANYL CITRATE 50 UG/ML
25 INJECTION, SOLUTION INTRAMUSCULAR; INTRAVENOUS EVERY 5 MIN PRN
Status: DISCONTINUED | OUTPATIENT
Start: 2024-05-15 | End: 2024-05-15 | Stop reason: HOSPADM

## 2024-05-15 RX ORDER — NALOXONE HYDROCHLORIDE 0.4 MG/ML
0.1 INJECTION, SOLUTION INTRAMUSCULAR; INTRAVENOUS; SUBCUTANEOUS
Status: DISCONTINUED | OUTPATIENT
Start: 2024-05-15 | End: 2024-05-15 | Stop reason: HOSPADM

## 2024-05-15 RX ORDER — NALOXONE HYDROCHLORIDE 0.4 MG/ML
0.1 INJECTION, SOLUTION INTRAMUSCULAR; INTRAVENOUS; SUBCUTANEOUS
Status: DISCONTINUED | OUTPATIENT
Start: 2024-05-15 | End: 2024-05-16 | Stop reason: HOSPADM

## 2024-05-15 RX ORDER — HYDROCODONE BITARTRATE AND ACETAMINOPHEN 5; 325 MG/1; MG/1
1-2 TABLET ORAL EVERY 4 HOURS PRN
Qty: 30 TABLET | Refills: 0 | Status: SHIPPED | OUTPATIENT
Start: 2024-05-15 | End: 2024-06-21

## 2024-05-15 RX ORDER — PROPOFOL 10 MG/ML
INJECTION, EMULSION INTRAVENOUS CONTINUOUS PRN
Status: DISCONTINUED | OUTPATIENT
Start: 2024-05-15 | End: 2024-05-15

## 2024-05-15 RX ORDER — AMOXICILLIN 250 MG
1-2 CAPSULE ORAL 2 TIMES DAILY
Qty: 30 TABLET | Refills: 0 | Status: SHIPPED | OUTPATIENT
Start: 2024-05-15 | End: 2024-06-21

## 2024-05-15 RX ORDER — OXYCODONE HYDROCHLORIDE 5 MG/1
5 TABLET ORAL
Status: COMPLETED | OUTPATIENT
Start: 2024-05-15 | End: 2024-05-15

## 2024-05-15 RX ADMIN — PROPOFOL 50 MG: 10 INJECTION, EMULSION INTRAVENOUS at 08:33

## 2024-05-15 RX ADMIN — SODIUM CHLORIDE, SODIUM LACTATE, POTASSIUM CHLORIDE, CALCIUM CHLORIDE: 600; 310; 30; 20 INJECTION, SOLUTION INTRAVENOUS at 06:53

## 2024-05-15 RX ADMIN — OXYCODONE HYDROCHLORIDE 5 MG: 5 TABLET ORAL at 12:56

## 2024-05-15 RX ADMIN — Medication 0.5 MG: at 08:32

## 2024-05-15 RX ADMIN — ACETAMINOPHEN 975 MG: 325 TABLET ORAL at 06:52

## 2024-05-15 RX ADMIN — FENTANYL CITRATE 50 MCG: 50 INJECTION, SOLUTION INTRAMUSCULAR; INTRAVENOUS at 07:51

## 2024-05-15 RX ADMIN — PROPOFOL 150 MCG/KG/MIN: 10 INJECTION, EMULSION INTRAVENOUS at 10:21

## 2024-05-15 RX ADMIN — PROPOFOL 175 MCG/KG/MIN: 10 INJECTION, EMULSION INTRAVENOUS at 09:38

## 2024-05-15 RX ADMIN — PROPOFOL 150 MCG/KG/MIN: 10 INJECTION, EMULSION INTRAVENOUS at 07:19

## 2024-05-15 RX ADMIN — SCOLOPAMINE TRANSDERMAL SYSTEM 1 PATCH: 1 PATCH, EXTENDED RELEASE TRANSDERMAL at 06:56

## 2024-05-15 RX ADMIN — ONDANSETRON 4 MG: 2 INJECTION INTRAMUSCULAR; INTRAVENOUS at 10:08

## 2024-05-15 RX ADMIN — SODIUM CHLORIDE, SODIUM LACTATE, POTASSIUM CHLORIDE, CALCIUM CHLORIDE: 600; 310; 30; 20 INJECTION, SOLUTION INTRAVENOUS at 09:34

## 2024-05-15 RX ADMIN — PROPOFOL 200 MG: 10 INJECTION, EMULSION INTRAVENOUS at 07:19

## 2024-05-15 RX ADMIN — LIDOCAINE HYDROCHLORIDE 80 MG: 20 INJECTION, SOLUTION INFILTRATION; PERINEURAL at 07:19

## 2024-05-15 RX ADMIN — CEFAZOLIN SODIUM 2 G: 2 SOLUTION INTRAVENOUS at 07:11

## 2024-05-15 RX ADMIN — PROPOFOL 175 MCG/KG/MIN: 10 INJECTION, EMULSION INTRAVENOUS at 08:57

## 2024-05-15 RX ADMIN — PROPOFOL 50 MG: 10 INJECTION, EMULSION INTRAVENOUS at 08:56

## 2024-05-15 RX ADMIN — GLYCOPYRROLATE 0.2 MG: 0.2 INJECTION, SOLUTION INTRAMUSCULAR; INTRAVENOUS at 07:19

## 2024-05-15 RX ADMIN — Medication 0.5 MG: at 10:27

## 2024-05-15 RX ADMIN — FENTANYL CITRATE 50 MCG: 50 INJECTION, SOLUTION INTRAMUSCULAR; INTRAVENOUS at 07:35

## 2024-05-15 RX ADMIN — Medication 100 MCG: at 07:56

## 2024-05-15 RX ADMIN — PROPOFOL 125 MCG/KG/MIN: 10 INJECTION, EMULSION INTRAVENOUS at 08:08

## 2024-05-15 RX ADMIN — CEFAZOLIN SODIUM 2 G: 2 SOLUTION INTRAVENOUS at 10:50

## 2024-05-15 NOTE — DISCHARGE INSTRUCTIONS
[facility]  Home Care Following Anesthesia  For 24 hours after surgery:  Get plenty of rest.  A responsible adult must stay with you for at least 24 hours after you leave the surgery center.  Do not drive or use heavy equipment.  If you have weakness or tingling, don't drive or use heavy equipment until this feeling goes away.   Do not drink alcohol.   Avoid strenuous or risky activities.  Ask for help when climbing stairs.  You may feel lightheaded.  IF so, sit for a few minutes before standing.  Have someone help you get up.   If you have nausea (feel sick to your stomach): Drink only clear liquids such as apple juice, ginger ale, broth or 7-Up.  Rest may also help.  Be sure to drink enough fluids.  Move to a regular diet as you feel able.   You may have a slight fever.  Call the doctor if your fever is over 100 F (37.7 C) (taken under the tongue) or lasts longer than 24 hours.  You may have a dry mouth, a sore throat, muscle aches or trouble sleeping. These should go away after 24 hours.  Do not make important or legal decisions.   It is recommended to avoid smoking.               Tips for taking pain medications  To get the best pain relief possible, remember these points:  Take pain medications as directed, before pain becomes severe.  Pain medication can upset your stomach: taking it with food may help.  Constipation is a common side effect of pain medication. Drink plenty of  fluids.  Eat foods high in fiber. Take a stool softener if recommended by your doctor or pharmacist.  Do not drink alcohol, drive or operate machinery while taking pain medications.  Ask about other ways to control pain, such as with heat, ice or relaxation.    Tylenol/Acetaminophen Consumption    If you feel your pain relief is insufficient, you may take Tylenol/Acetaminophen in addition to your narcotic pain medication.   Be careful not to exceed 4,000 mg of Tylenol/Acetaminophen in a 24 hour  period from all sources.  If you are taking extra strength Tylenol/acetaminophen (500 mg), the maximum dose is 8 tablets in 24 hours.  If you are taking regular strength acetaminophen (325 mg), the maximum dose is 12 tablets in 24 hours.    Call a doctor for any of the following:  Signs of infection (fever, growing tenderness at the surgery site, a large amount of drainage or bleeding, severe pain, foul-smelling drainage, redness, swelling).  It has been over 8 to 10 hours since surgery and you are still not able to urinate (pass water).  Headache for over 24 hours.  Numbness, tingling or weakness the day after surgery (if you had spinal anesthesia).  Signs of Covid-19 infection (temperature over 100 degrees, shortness of breath, cough, loss of taste/smell, generalized body aches, persistent headache, chills, sore throat, nausea/vomiting/diarrhea)  Your doctor is:       Dr. Sonny aP, Plastic Surgery: [phone]               Or dial [phone] and ask for the resident on call for:  Plastics  For emergency care, call the:  [facility] Emergency Department: [phone] (TTY for hearing impaired: [phone])

## 2024-05-15 NOTE — ANESTHESIA POSTPROCEDURE EVALUATION
Patient: Noemi Farias    Procedure: Procedure(s):  BILATERAL MASTECTOMY, SIMPLE       Anesthesia Type:  General    Note:  Disposition: Outpatient   Postop Pain Control: Uneventful            Sign Out: Well controlled pain   PONV: No   Neuro/Psych: Uneventful            Sign Out: Acceptable/Baseline neuro status   Airway/Respiratory: Uneventful            Sign Out: Acceptable/Baseline resp. status   CV/Hemodynamics: Uneventful            Sign Out: Acceptable CV status; No obvious hypovolemia; No obvious fluid overload   Other NRE: NONE   DID A NON-ROUTINE EVENT OCCUR? No       Last vitals:  Vitals Value Taken Time   /62 05/15/24 1230   Temp 36.6  C (97.9  F) 05/15/24 1230   Pulse 89 05/15/24 1230   Resp 12 05/15/24 1230   SpO2 97 % 05/15/24 1230       Electronically Signed By: Danitza Hickey MD  May 15, 2024  2:08 PM

## 2024-05-15 NOTE — OP NOTE
May 15, 2024    Jorge Farias (Kelsy A)    Preoperative diagnosis: gender dysphoria.     Postoperative diagnosis: same.     Procedure: Bilateral simple mastectomy with NO nipple grafting.      Surgeon: Sonny Pa MD (no plastic surgery resident available).     Assistant: None     Anesthesia: General.     Estimated blood loss: 20 mL.     Intravenous fluid: Please see anesthesia record      Tumescent solution: 300 mL per breast for a total of 600 mL (from 1,000 mL of Lactated Ringer and 1 mg of Epinephrine).     Urine output: Please see anesthesia record     Findings: macroscopically normal appearing breasts     Specimens: right breast 555 grams, left breast 584 grams.     Drains: two # 15 FR Yevgeniy drains. One per side.     Complications: none.     Disposition: Patient tolerated procedure well and was extubated and transferred to recovery room awake and in stable condition.     Indication:     This is a 33 year old patient with diagnosis of gender dysphoria.  The patient met WPATH and insurance criteria for gender affirming top surgery.  Patient did NOT wish to preserve nipple areolar complexes, hence, nipple grafting was NOT offered.  Risks were explained to the patient and they include but are not limited to scarring, infection, keloid formation, hematoma, seroma, contour irregularities, guarantee permanent anesthesia at the level of bilateral mastectomy areas.  Patient acknowledged all these risks and agreed to proceed.     Description of procedure:     The patient was seen in the preoperative holding area and preoperative markings were drawn on the chest.  First, the midline was drawn, followed by drawing of the impression of the inferior edge of bilateral pectoralis major muscles on the chest's skin.  I also marked the lateral border of the pectoralis major muscle, from the anterior axillary line to the 9 o'clock position on the nipple areolar complex. I also marked the inframammary fold (IMF) bilaterally.      This is how the markings looked like on the patient:                   Preoperative IV antibiotics were given to the patient and the patient was then brought to the operating room and was placed supine on the operating room table.  General endotracheal anesthesia was then obtained.  Pantoja catheter was introduced. The patient already had sequential compression devices on the lower extremities. Thighs and forelegs were supported with pillows and secured with padded safety straps.  The arms were secured to arm boards at 90 degree angles from the table using Ace wraps.  Lower lana hugger was in place.  The patient was then put into a sitting position and adjustments were made for necessary symmetry.     Then the chest and breast area was prepped and draped in a sterile surgical fashion using ChloraPrep.       After timeout, bilateral stab incisions were made with scalpel # 15 on the outer lower quadrant of each breast and tumescent solution with a Klein's infiltrating canula was infiltrated in the sub glandular/supra-aponeurotic space. This will later facilitate dissection and hemostasis.    Then, we make incision on the skin markings for the inferior edge of the pectoralis major muscle.  Dissection with electrocautery was carried down to the capsule of bilateral breasts.  Essentially, the plane for dissection was between the subcutaneous fat and the breast capsule.  This dissection continued cranially towards the clavicles until the superior border of the breast parenchyma was visualized.  With this dissection, we were able to develop the superior mastectomy flap bilaterally.    We also dissected the space from the superior border of the breast parenchyma until the inferior edge of the clavicle.  This will later facilitate tension-free closure of the superior mastectomy flap with the lower mastectomy flap.     The breast mound was elevated from the pectoralis major muscle fascia and undermined inferiorly towards the  IMF, medially towards parasternal border but staying 2 cm laterally from the midline, and finally laterally, past the lateral border of the pectoralis major muscle, incorporating the axillary tail bilaterally. Inferiorly, the IMF was obliterated and we dissected at least 2 cm below the IMF to ensure its obliteration and a more masculine look on the patient.     At this time, we pulled inferiorly both upper mastectomy flaps and marked where they overlapped on the skin surface of the inferior pole of each breast.  This marking was approximately 3 cm cranially from the IMF.    We then proceeded to make incision on this second marking and continue dissection of the breast parenchyma between the subcutaneous tissue and the breast capsule until we reach the inframammary fold bilaterally.  This dissection developed the inferior mastectomy flap.  At this time, the breast mound was completely excised from the underlying pectoralis major muscle fascia as a mastectomy and sent to pathology as a specimens.  The right breast weight 555 grams and the left breast weight 584 grams.      After this first resection, our incisions were temporarily closed with skin staples.  The patient was put into a sitting position.  This allowed us to make further adjustment with regard to the level of our incisions as well as the contour and shape of the chest wall.  We then resected all the markings and the extra tissue that wound gain us better symmetry.  When we were satisfied with the contour, we then irrigated the mastectomy pockets and achieved excellent hemostasis with electrocautery.     A #15 Yevgeniy drains were then introduced through separate stab incisions laterally and secured with 2-0 silk suture. The mastectomy wounds were then closed in layers with 3-0 Monocryl deep dermal buried interrupted stitches, followed by 4-0 V-Loc running subcuticular stitches.       Bacitracin ointment and Xeroform extra gauze was used to seal the  incisions.  The Yevgeniy drains were trimmed and put to bulb suction.  Dressings of Kerlix rolls were unfurled across the anterior chest for padding before wrapping the chest circumferentially with a double long 8 inch Ace wrap for compression.  Instrument count was reported by nursing personnel as correct, patient tolerated procedure well and was extubated and transferred to recovery room awake in stable condition.    Sonny Pa MD , FACS   Diplomate American Board of Plastic Surgery  Diplomate American Board of Surgery  Adj. Assistant Professor of Surgery  Division of Plastic & Reconstructive Surgery   [facility]  Office: [phone]   5/15/2024 at 6:34 PM

## 2024-05-15 NOTE — PROGRESS NOTES
Patient elected NOT to have nipple grafting.    I will proceed with bilateral mastectomies with NO nipple grafting.      Sonny Pa MD , FACS   Diplomate American Board of Plastic Surgery  Diplomate American Board of Surgery  Adj. Assistant Professor of Surgery  Division of Plastic & Reconstructive Surgery   [facility]  Office: [phone]   5/15/2024 at 7:11 AM

## 2024-05-15 NOTE — ANESTHESIA CARE TRANSFER NOTE
Patient: Noemi Farias    Procedure: Procedure(s):  BILATERAL MASTECTOMY, SIMPLE       Diagnosis: Gender dysphoria in adult [F64.0]  Diagnosis Additional Information: No value filed.    Anesthesia Type:   General     Note:    Oropharynx: oropharynx clear of all foreign objects and spontaneously breathing  Level of Consciousness: drowsy  Oxygen Supplementation: face mask    Independent Airway: airway patency satisfactory and stable  Dentition: dentition unchanged  Vital Signs Stable: post-procedure vital signs reviewed and stable  Report to RN Given: handoff report given  Patient transferred to: PACU    Handoff Report: Identifed the Patient, Identified the Reponsible Provider, Reviewed the pertinent medical history, Discussed the surgical course, Reviewed Intra-OP anesthesia mangement and issues during anesthesia, Set expectations for post-procedure period and Allowed opportunity for questions and acknowledgement of understanding      Vitals:  Vitals Value Taken Time   /55 05/15/24 1128   Temp 36.4  C (97.5  F) 05/15/24 1128   Pulse 89 05/15/24 1128   Resp 17 05/15/24 1128   SpO2 98 % 05/15/24 1128   Vitals shown include unfiled device data.    Electronically Signed By: RENETTA Avitia CRNA  May 15, 2024  11:30 AM

## 2024-05-15 NOTE — ANESTHESIA PROCEDURE NOTES
Airway       Patient location during procedure: OR       Procedure Start/Stop Times: 5/15/2024 7:21 AM  Staff -        CRNA: Patricia Khalil APRN CRNA       Performed By: CRNA  Consent for Airway        Urgency: elective  Indications and Patient Condition       Indications for airway management: isaac-procedural       Induction type:intravenous       Mask difficulty assessment: 1 - vent by mask    Final Airway Details       Final airway type: supraglottic airway    Supraglottic Airway Details        Type: LMA       Brand: I-Gel       LMA size: 4    Post intubation assessment        Placement verified by: capnometry and chest rise        Number of attempts at approach: 1       Secured with: tape       Ease of procedure: easy       Dentition: Intact and Unchanged    Medication(s) Administered   Medication Administration Time: 5/15/2024 7:21 AM

## 2024-05-23 LAB
PATH REPORT.COMMENTS IMP SPEC: NORMAL
PATH REPORT.COMMENTS IMP SPEC: NORMAL
PATH REPORT.FINAL DX SPEC: NORMAL
PATH REPORT.GROSS SPEC: NORMAL
PATH REPORT.MICROSCOPIC SPEC OTHER STN: NORMAL
PATH REPORT.RELEVANT HX SPEC: NORMAL
PHOTO IMAGE: NORMAL

## 2024-05-24 ENCOUNTER — OFFICE VISIT (OUTPATIENT)
Dept: PLASTIC SURGERY | Facility: CLINIC | Age: 34
End: 2024-05-24
Payer: COMMERCIAL

## 2024-05-24 VITALS
HEIGHT: 62 IN | BODY MASS INDEX: 28.43 KG/M2 | OXYGEN SATURATION: 96 % | DIASTOLIC BLOOD PRESSURE: 83 MMHG | HEART RATE: 96 BPM | WEIGHT: 154.5 LBS | SYSTOLIC BLOOD PRESSURE: 131 MMHG | TEMPERATURE: 97.9 F

## 2024-05-24 DIAGNOSIS — Z90.13 STATUS POST MASTECTOMY, BILATERAL: Primary | ICD-10-CM

## 2024-05-24 PROCEDURE — 99024 POSTOP FOLLOW-UP VISIT: CPT | Performed by: NURSE PRACTITIONER

## 2024-05-24 ASSESSMENT — PAIN SCALES - GENERAL: PAINLEVEL: MILD PAIN (3)

## 2024-05-24 NOTE — PROGRESS NOTES
Subjective:  Jorge is a 33 year old patient status post gender affirming bilateral double incision mastectomy with no nipple grafting with Dr Pa on 5/15/24. They are 9 days out from surgery and feeling well. Pain has been minimal and very well managed. Using mostly Tylenol only with only occasional ibuprofen. Drain outputs < 15 ml per day for the past 3 days. They are reconsidering RTW date and hoping to stay out through 6/2/24 with return to work date of 6/3/24 so they have an additional weekend to recover. They will reach out to Ember's nurse with this form.    Objective:  General: NAD  Chest: Chest incisions c/d/I without wounds or drainage. dDressings were removed, bilateral rakesh drains have been removed. Patient presents with excellent symmetry on bilateral incision mastectomies. No evidence of infection or dehiscence or late seroma or hematoma.     Assessment/Plan:   S/P bilateral mastectomy  Begin applying compression garment and cococa butter lotion over the incision. Avoid heavy lifting for the next 5 weeks and return in 2-3 weeks for your next post-op visit.     RENETTA Zuluaga CNP on 5/24/2024 at 9:05 AM

## 2024-05-24 NOTE — PATIENT INSTRUCTIONS
Hussein Patel,    It was greats to see you at your first post-op appointment today! As a reminder here are the post-op instructions we discussed, please read through them and reach out should you have any questions or concerns.     You can take a shower today! Please make sure someone is home with you during your first shower in case you feel weak or lightheaded. You can remove all dressings prior to showering.   If you have nipple grafts please do not let the shower water hit you from the front for the next 7 days. Please shower with your back to the stream of water. It is okay for the grafts to get wet, but we do not want direct water pressure to the grafts as it can detach them if too strong.   If you DO NOT have nipple grafts, please shower as you normally would.     After your shower you will need to pat the surgical area dry and apply antibiotic ointment to the surgical site, nipple grafts (if you have them) and drain sites. The drain sites will also need a piece of gauze and tape to hold the gauze in place for the next 3 days because they can ooze after they have been removed and while they are healing. Please continue to use antibiotic ointment for an additional 4 days. Once the week of antibiotic ointment is up, you will switch to cocoa butter lotion.     Cocoa butter lotion will need to be massaged around the entire surgical site 3 times per day for the next 3-6 months. This will help with scarring and diminish the appearance of your suture lines. It is very important to massage this lotion in when you are applying it as the massage aspect is key to the improvement of scar appearance.     You are now able to wear Deoderant, please note it can be tender under and around your armpit area. Please apply gently.     You will need to wear a compression wrap 24/7 unless showering until cleared by Dr. Pa. We like the wrap from the company [employer] in the style [id number]. It can be purchased using the  URL: https://[employer]/product/fs-406g-male-body-wrap/.     As a reminder you have a 10 lbs weight restriction until you hit 6 weeks post. So even though you may be feeling much better, you need to follow the post-op restrictions to prevent any complications. We would like to remind you not to lift, push, pull or carry anything greater than 10lbs or reach overhead during the healing period. Once you are at the 6 week post-op you can return to normal activities.     Again, we are here should you need anything! Do not hesitate to reach out! Wishing you a continued speedy recovery! We look forward to seeing you at your next scheduled post-op visit.     Tiffany Nava APRN CNP on 5/24/2024 at 9:06 AM

## 2024-05-24 NOTE — LETTER
5/24/2024       RE: Noemi Farias  [address] [address]       Dear Colleague,    Thank you for referring your patient, Noemi Farias, to the [facility]LAST[facility]ON[facility]GERY [facility]IN[facility] [place] at [facility]. Please see a copy of my visit note below.    Subjective:  Jorge is a 33 year old patient status post gender affirming bilateral double incision mastectomy with no nipple grafting with Dr Pa on 5/15/24. They are 9 days out from surgery and feeling well. Pain has been minimal and very well managed. Using mostly Tylenol only with only occasional ibuprofen. Drain outputs < 15 ml per day for the past 3 days. They are reconsidering RTW date and hoping to stay out through 6/2/24 with return to work date of 6/3/24 so they have an additional weekend to recover. They will reach out to Ember's nurse with this form.    Objective:  General: NAD  Chest: Chest incisions c/d/I without wounds or drainage. dDressings were removed, bilateral rakesh drains have been removed. Patient presents with excellent symmetry on bilateral incision mastectomies. No evidence of infection or dehiscence or late seroma or hematoma.     Assessment/Plan:   S/P bilateral mastectomy  Begin applying compression garment and cococa butter lotion over the incision. Avoid heavy lifting for the next 5 weeks and return in 2-3 weeks for your next post-op visit.     Again, thank you for allowing me to participate in the care of your patient.      Sincerely,    RENETTA Zuluaga CNP

## 2024-05-24 NOTE — NURSING NOTE
"  Chief Complaint   Patient presents with    Surgical Followup     1 week post-op, DOS 5/15/24.       Vitals:    05/24/24 0850   BP: 131/83   BP Location: Left arm   Patient Position: Sitting   Cuff Size: Adult Regular   Pulse: 96   Temp: 97.9  F (36.6  C)   TempSrc: Oral   SpO2: 96%   Weight: 154 lb 8 oz   Height: 5' 2\"       Body mass index is 28.26 kg/m .    Patient reports mild discomfort inferior to chest incision (3/10).    Jed Cordova, EMT    "

## 2024-06-21 ENCOUNTER — OFFICE VISIT (OUTPATIENT)
Dept: PLASTIC SURGERY | Facility: AMBULATORY SURGERY CENTER | Age: 34
End: 2024-06-21
Payer: COMMERCIAL

## 2024-06-21 DIAGNOSIS — Z90.13 STATUS POST BILATERAL MASTECTOMY: Primary | ICD-10-CM

## 2024-06-21 PROCEDURE — 99024 POSTOP FOLLOW-UP VISIT: CPT | Performed by: PLASTIC SURGERY

## 2024-06-21 NOTE — LETTER
6/21/2024      Noemi Farias  [address] [address]      Dear Colleague,    Thank you for referring your patient, Noemi Farias, to the [facility]LAST[facility]GERY [facility] [place]. Please see a copy of my visit note below.    Jorge is a 33 years old patient status post bilateral mastectomies with no nipple grafting.  Patient is 5 weeks out from surgery.  They are doing well.    At the physical exam, all incisions are well-healed.  No sign of hypertrophic scarring or keloids.  Good shape and definition bilaterally.                    Plan: Patient is doing excellent from plastic surgery standpoint.  I am very pleased with their results.  Patient is also happy with results.  They are aware that I am leaving.  Follow-up as needed.    Sonny Pa MD , FACS   Diplomate American Board of Plastic Surgery  Diplomate American Board of Surgery  Adj. Assistant Professor of Surgery  Division of Plastic & Reconstructive Surgery   [facility]  Office: [phone]   6/21/2024 at 10:56 AM       Again, thank you for allowing me to participate in the care of your patient.        Sincerely,        Sonny Pa MD

## 2024-06-21 NOTE — PROGRESS NOTES
Jorge is a 33 years old patient status post bilateral mastectomies with no nipple grafting.  Patient is 5 weeks out from surgery.  They are doing well.    At the physical exam, all incisions are well-healed.  No sign of hypertrophic scarring or keloids.  Good shape and definition bilaterally.                    Plan: Patient is doing excellent from plastic surgery standpoint.  I am very pleased with their results.  Patient is also happy with results.  They are aware that I am leaving.  Follow-up as needed.    Sonny Pa MD , FACS   Diplomate American Board of Plastic Surgery  Diplomate American Board of Surgery  Adj. Assistant Professor of Surgery  Division of Plastic & Reconstructive Surgery   [facility]  Office: [phone]   6/21/2024 at 10:56 AM

## 2025-03-09 ENCOUNTER — HEALTH MAINTENANCE LETTER (OUTPATIENT)
Age: 35
End: 2025-03-09

## (undated) DEVICE — SU MONOCRYL 4-0 PS-2 27" UND Y426H

## (undated) DEVICE — KLEIN INFILTRATION SET SINGLE SPIKE ITS-10

## (undated) DEVICE — DRAPE IOBAN INCISE 23X17" 6650EZ

## (undated) DEVICE — DRAIN JACKSON PRATT RESERVOIR 100ML SU130-1305

## (undated) DEVICE — DRSG ABDOMINAL 07 1/2X8" 7197D

## (undated) DEVICE — DRSG XEROFORM 5X9" 8884431605

## (undated) DEVICE — GLOVE BIOGEL PI MICRO SZ 6.5 48565

## (undated) DEVICE — LINEN TOWEL PACK X5 5464

## (undated) DEVICE — UNIVERSAL DRAPE PACK 29118

## (undated) DEVICE — PREP CHLORAPREP 26ML TINTED HI-LITE ORANGE 930815

## (undated) DEVICE — ESU GROUND PAD ADULT W/CORD E7507

## (undated) DEVICE — BLADE KNIFE SURG 15 371115

## (undated) DEVICE — DRAIN JACKSON PRATT CHANNEL 15FR ROUND HUBLESS SIL JP-2228

## (undated) DEVICE — GLOVE BIOGEL PI MICRO INDICATOR UNDERGLOVE SZ 8.0 48980

## (undated) DEVICE — STPL SKIN 35W ROTATING HEAD PRW35

## (undated) DEVICE — SUCTION TIP YANKAUER W/O VENT K86

## (undated) DEVICE — CATH TRAY FOLEY SURESTEP 16FR W/DRAIN BAG LF A300416A

## (undated) DEVICE — GOWN LG DISP 9515

## (undated) DEVICE — SOL NACL 0.9% IRRIG 500ML BOTTLE 2F7123

## (undated) DEVICE — PAD CHUX UNDERPAD 30X36" P3036C

## (undated) DEVICE — BNDG ELASTIC 6" DBL LENGTH UNSTERILE 6611-16

## (undated) DEVICE — DRSG KERLIX 4 1/2"X4YDS ROLL 6730

## (undated) DEVICE — BNDG ELASTIC 6"X5YDS UNSTERILE 6611-60

## (undated) DEVICE — SUCTION MANIFOLD NEPTUNE 2 SYS 1 PORT 702-025-000

## (undated) DEVICE — ESU ELEC BLADE HEX-LOCKING 2.5" E1450X

## (undated) DEVICE — STRAP KNEE/BODY 31143004

## (undated) DEVICE — SU WND CLOSURE VLOC 90 ABS 4-0 18" P-12 VLOCM0023

## (undated) DEVICE — PACK MINOR CUSTOM ASC

## (undated) DEVICE — PEN MARKING SKIN W/PAPER RULER 31145785

## (undated) DEVICE — ESU CLEANER TIP 31142717

## (undated) DEVICE — SU SILK 2-0 SH 30" K833H

## (undated) DEVICE — SPONGE LAP 18X18" X8435

## (undated) DEVICE — GLOVE BIOGEL PI MICRO INDICATOR UNDERGLOVE SZ 7.0 48970

## (undated) DEVICE — SU MONOCRYL 3-0 SH 27" UND Y416H

## (undated) DEVICE — ESU PENCIL SMOKE EVAC W/ROCKER SWITCH 0703-047-000

## (undated) DEVICE — GLOVE GAMMEX NEOPRENE ULTRA SZ 7.5 LF 8515

## (undated) DEVICE — SOL RINGERS LACTATED 1000ML BAG 2B2324X

## (undated) RX ORDER — PROPOFOL 10 MG/ML
INJECTION, EMULSION INTRAVENOUS
Status: DISPENSED
Start: 2024-05-15

## (undated) RX ORDER — CEFAZOLIN SODIUM 1 G/3ML
INJECTION, POWDER, FOR SOLUTION INTRAMUSCULAR; INTRAVENOUS
Status: DISPENSED
Start: 2024-05-15

## (undated) RX ORDER — DEXMEDETOMIDINE HYDROCHLORIDE 4 UG/ML
INJECTION, SOLUTION INTRAVENOUS
Status: DISPENSED
Start: 2024-05-15

## (undated) RX ORDER — OXYCODONE HYDROCHLORIDE 5 MG/1
TABLET ORAL
Status: DISPENSED
Start: 2024-05-15

## (undated) RX ORDER — FENTANYL CITRATE-0.9 % NACL/PF 10 MCG/ML
PLASTIC BAG, INJECTION (ML) INTRAVENOUS
Status: DISPENSED
Start: 2024-05-15

## (undated) RX ORDER — DEXAMETHASONE SODIUM PHOSPHATE 4 MG/ML
INJECTION, SOLUTION INTRA-ARTICULAR; INTRALESIONAL; INTRAMUSCULAR; INTRAVENOUS; SOFT TISSUE
Status: DISPENSED
Start: 2024-05-15

## (undated) RX ORDER — GLYCOPYRROLATE 0.2 MG/ML
INJECTION INTRAMUSCULAR; INTRAVENOUS
Status: DISPENSED
Start: 2024-05-15

## (undated) RX ORDER — CEFAZOLIN SODIUM 2 G/50ML
SOLUTION INTRAVENOUS
Status: DISPENSED
Start: 2024-05-15

## (undated) RX ORDER — FENTANYL CITRATE 50 UG/ML
INJECTION, SOLUTION INTRAMUSCULAR; INTRAVENOUS
Status: DISPENSED
Start: 2024-05-15

## (undated) RX ORDER — EPINEPHRINE 1 MG/ML
INJECTION, SOLUTION INTRAMUSCULAR; SUBCUTANEOUS
Status: DISPENSED
Start: 2024-05-15

## (undated) RX ORDER — HYDROMORPHONE HYDROCHLORIDE 1 MG/ML
INJECTION, SOLUTION INTRAMUSCULAR; INTRAVENOUS; SUBCUTANEOUS
Status: DISPENSED
Start: 2024-05-15

## (undated) RX ORDER — TRANEXAMIC ACID 100 MG/ML
INJECTION, SOLUTION INTRAVENOUS
Status: DISPENSED
Start: 2024-05-15

## (undated) RX ORDER — SCOLOPAMINE TRANSDERMAL SYSTEM 1 MG/1
PATCH, EXTENDED RELEASE TRANSDERMAL
Status: DISPENSED
Start: 2024-05-15

## (undated) RX ORDER — ONDANSETRON 2 MG/ML
INJECTION INTRAMUSCULAR; INTRAVENOUS
Status: DISPENSED
Start: 2024-05-15

## (undated) RX ORDER — ACETAMINOPHEN 325 MG/1
TABLET ORAL
Status: DISPENSED
Start: 2024-05-15